# Patient Record
Sex: MALE | URBAN - METROPOLITAN AREA
[De-identification: names, ages, dates, MRNs, and addresses within clinical notes are randomized per-mention and may not be internally consistent; named-entity substitution may affect disease eponyms.]

---

## 2020-05-29 ENCOUNTER — APPOINTMENT (OUTPATIENT)
Age: 25
Setting detail: DERMATOLOGY
End: 2020-06-02

## 2020-05-29 DIAGNOSIS — B86 SCABIES: ICD-10-CM

## 2020-05-29 PROBLEM — D23.39 OTHER BENIGN NEOPLASM OF SKIN OF OTHER PARTS OF FACE: Status: ACTIVE | Noted: 2020-05-29

## 2020-05-29 PROCEDURE — OTHER REASSURANCE: OTHER

## 2020-05-29 PROCEDURE — OTHER COUNSELING: OTHER

## 2020-05-29 PROCEDURE — OTHER PRESCRIPTION: OTHER

## 2020-05-29 PROCEDURE — OTHER MIPS QUALITY: OTHER

## 2020-05-29 PROCEDURE — 99203 OFFICE O/P NEW LOW 30 MIN: CPT

## 2020-05-29 PROCEDURE — OTHER KOH PREP: OTHER

## 2020-05-29 PROCEDURE — 87220 TISSUE EXAM FOR FUNGI: CPT

## 2020-05-29 PROCEDURE — OTHER OTHER: OTHER

## 2020-05-29 RX ORDER — PERMETHRIN 50 MG/G
CREAM TOPICAL QD
Qty: 2 | Refills: 0 | Status: ERX | COMMUNITY
Start: 2020-05-29

## 2020-05-29 ASSESSMENT — LOCATION DETAILED DESCRIPTION DERM
LOCATION DETAILED: RIGHT MEDIAL SUPERIOR CHEST
LOCATION DETAILED: RIGHT DISTAL DORSAL FOREARM
LOCATION DETAILED: LEFT LATERAL SUPERIOR CHEST
LOCATION DETAILED: LEFT ULNAR DORSAL HAND
LOCATION DETAILED: LEFT RADIAL DORSAL HAND
LOCATION DETAILED: LEFT PROXIMAL DORSAL FOREARM
LOCATION DETAILED: RIGHT ULNAR DORSAL HAND

## 2020-05-29 ASSESSMENT — LOCATION SIMPLE DESCRIPTION DERM
LOCATION SIMPLE: LEFT FOREARM
LOCATION SIMPLE: LEFT HAND
LOCATION SIMPLE: RIGHT HAND
LOCATION SIMPLE: RIGHT FOREARM
LOCATION SIMPLE: CHEST

## 2020-05-29 ASSESSMENT — LOCATION ZONE DERM
LOCATION ZONE: TRUNK
LOCATION ZONE: HAND
LOCATION ZONE: ARM

## 2020-05-29 NOTE — PROCEDURE: OTHER
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
Detail Level: Zone
Note Text (......Xxx Chief Complaint.): This diagnosis correlates with the

## 2020-05-29 NOTE — PROCEDURE: KOH PREP
Koh Procedure Text (Tissue Harvesting Technique): A 15-blade scalpel was used to scrape the skin. The skin scrapings were placed on a glass slide, covered with a coverslip and a KOH solution was applied.
Showing: +/- results
Detail Level: Simple
Koh Intro Text (From The.....): A KOH prep was ordered and evaluated from the

## 2020-05-29 NOTE — PROCEDURE: MIPS QUALITY
Name And Contact Information For Health Care Proxy: Ligia zhu family [phone] Name And Contact Information For Health Care Proxy: Ligia zhu family [phone]

## 2020-06-01 ENCOUNTER — RX ONLY (RX ONLY)
Age: 25
End: 2020-06-01

## 2020-06-01 RX ORDER — TRIAMCINOLONE ACETONIDE 1 MG/G
CREAM TOPICAL
Qty: 1 | Refills: 0 | Status: ERX | COMMUNITY
Start: 2020-06-01